# Patient Record
Sex: FEMALE | Race: WHITE | Employment: OTHER | URBAN - NONMETROPOLITAN AREA
[De-identification: names, ages, dates, MRNs, and addresses within clinical notes are randomized per-mention and may not be internally consistent; named-entity substitution may affect disease eponyms.]

---

## 2017-08-02 ENCOUNTER — OFFICE VISIT (OUTPATIENT)
Dept: GASTROENTEROLOGY | Age: 57
End: 2017-08-02
Payer: MEDICARE

## 2017-08-02 VITALS
OXYGEN SATURATION: 96 % | HEIGHT: 66 IN | SYSTOLIC BLOOD PRESSURE: 124 MMHG | WEIGHT: 293 LBS | HEART RATE: 97 BPM | BODY MASS INDEX: 47.09 KG/M2 | DIASTOLIC BLOOD PRESSURE: 76 MMHG

## 2017-08-02 DIAGNOSIS — K59.09 CHRONIC CONSTIPATION: ICD-10-CM

## 2017-08-02 DIAGNOSIS — Z86.010 HISTORY OF ADENOMATOUS POLYP OF COLON: ICD-10-CM

## 2017-08-02 DIAGNOSIS — K21.9 CHRONIC GERD: Primary | ICD-10-CM

## 2017-08-02 DIAGNOSIS — K22.70 BARRETT'S ESOPHAGUS DETERMINED BY BIOPSY: ICD-10-CM

## 2017-08-02 PROCEDURE — 3017F COLORECTAL CA SCREEN DOC REV: CPT | Performed by: NURSE PRACTITIONER

## 2017-08-02 PROCEDURE — G8417 CALC BMI ABV UP PARAM F/U: HCPCS | Performed by: NURSE PRACTITIONER

## 2017-08-02 PROCEDURE — G8427 DOCREV CUR MEDS BY ELIG CLIN: HCPCS | Performed by: NURSE PRACTITIONER

## 2017-08-02 PROCEDURE — 3014F SCREEN MAMMO DOC REV: CPT | Performed by: NURSE PRACTITIONER

## 2017-08-02 PROCEDURE — 99214 OFFICE O/P EST MOD 30 MIN: CPT | Performed by: NURSE PRACTITIONER

## 2017-08-02 PROCEDURE — 1036F TOBACCO NON-USER: CPT | Performed by: NURSE PRACTITIONER

## 2017-08-02 RX ORDER — WARFARIN SODIUM 4 MG/1
4 TABLET ORAL DAILY
COMMUNITY

## 2017-08-02 RX ORDER — WARFARIN SODIUM 5 MG/1
3 TABLET ORAL
COMMUNITY
End: 2019-11-04

## 2017-08-02 ASSESSMENT — ENCOUNTER SYMPTOMS
COUGH: 0
NAUSEA: 0
ABDOMINAL PAIN: 0
SHORTNESS OF BREATH: 0
CONSTIPATION: 1
DIARRHEA: 0
RECTAL PAIN: 0
SORE THROAT: 0
VOICE CHANGE: 0
CHEST TIGHTNESS: 0
BLOOD IN STOOL: 0
ABDOMINAL DISTENTION: 0
VOMITING: 0
BACK PAIN: 1

## 2017-08-11 ENCOUNTER — TELEPHONE (OUTPATIENT)
Dept: GASTROENTEROLOGY | Age: 57
End: 2017-08-11

## 2018-10-09 ENCOUNTER — OFFICE VISIT (OUTPATIENT)
Dept: OBGYN | Age: 58
End: 2018-10-09
Payer: MEDICARE

## 2018-10-09 VITALS
DIASTOLIC BLOOD PRESSURE: 78 MMHG | BODY MASS INDEX: 45.99 KG/M2 | SYSTOLIC BLOOD PRESSURE: 122 MMHG | WEIGHT: 293 LBS | HEART RATE: 88 BPM | HEIGHT: 67 IN

## 2018-10-09 DIAGNOSIS — Z12.72 SCREENING FOR MALIGNANT NEOPLASM OF VAGINA AFTER TOTAL HYSTERECTOMY: ICD-10-CM

## 2018-10-09 DIAGNOSIS — Z76.89 ENCOUNTER TO ESTABLISH CARE: ICD-10-CM

## 2018-10-09 DIAGNOSIS — Z90.710 SCREENING FOR MALIGNANT NEOPLASM OF VAGINA AFTER TOTAL HYSTERECTOMY: ICD-10-CM

## 2018-10-09 DIAGNOSIS — Z01.42 ENCOUNTER FOR PAPANICOLAOU CERVICAL SMEAR TO CONFIRM FINDINGS OF RECENT NORMAL SMEAR FOLLOWING INITIAL ABNORMAL SMEAR: Primary | ICD-10-CM

## 2018-10-09 PROCEDURE — 99203 OFFICE O/P NEW LOW 30 MIN: CPT | Performed by: NURSE PRACTITIONER

## 2018-10-09 PROCEDURE — G8417 CALC BMI ABV UP PARAM F/U: HCPCS | Performed by: NURSE PRACTITIONER

## 2018-10-09 PROCEDURE — G8427 DOCREV CUR MEDS BY ELIG CLIN: HCPCS | Performed by: NURSE PRACTITIONER

## 2018-10-09 PROCEDURE — 3017F COLORECTAL CA SCREEN DOC REV: CPT | Performed by: NURSE PRACTITIONER

## 2018-10-09 PROCEDURE — 1036F TOBACCO NON-USER: CPT | Performed by: NURSE PRACTITIONER

## 2018-10-09 PROCEDURE — G8484 FLU IMMUNIZE NO ADMIN: HCPCS | Performed by: NURSE PRACTITIONER

## 2018-10-09 RX ORDER — LORATADINE 10 MG/1
10 TABLET ORAL DAILY
COMMUNITY

## 2018-10-09 ASSESSMENT — ENCOUNTER SYMPTOMS
RESPIRATORY NEGATIVE: 1
ALLERGIC/IMMUNOLOGIC NEGATIVE: 1
BACK PAIN: 1
DIARRHEA: 0
EYES NEGATIVE: 1
CONSTIPATION: 0

## 2018-10-16 ENCOUNTER — TELEPHONE (OUTPATIENT)
Dept: OBGYN | Age: 58
End: 2018-10-16

## 2018-10-16 LAB
HPV TYPE 16: NOT DETECTED
HPV TYPE 18: NOT DETECTED
INTERPRETATION: ABNORMAL
OTHER HIGH RISK HPV: DETECTED
SOURCE: ABNORMAL

## 2018-11-26 ENCOUNTER — PROCEDURE VISIT (OUTPATIENT)
Dept: OBGYN | Age: 58
End: 2018-11-26
Payer: MEDICARE

## 2018-11-26 VITALS
SYSTOLIC BLOOD PRESSURE: 138 MMHG | BODY MASS INDEX: 45.99 KG/M2 | HEIGHT: 67 IN | DIASTOLIC BLOOD PRESSURE: 80 MMHG | WEIGHT: 293 LBS | HEART RATE: 87 BPM

## 2018-11-26 DIAGNOSIS — R87.811 ASCUS WITH POSITIVE HIGH RISK HUMAN PAPILLOMAVIRUS OF VAGINA: Primary | ICD-10-CM

## 2018-11-26 DIAGNOSIS — R87.620 ASCUS WITH POSITIVE HIGH RISK HUMAN PAPILLOMAVIRUS OF VAGINA: Primary | ICD-10-CM

## 2018-11-26 PROCEDURE — 57421 EXAM/BIOPSY OF VAG W/SCOPE: CPT | Performed by: OBSTETRICS & GYNECOLOGY

## 2018-11-26 RX ORDER — VENLAFAXINE HYDROCHLORIDE 37.5 MG/1
37.5 CAPSULE, EXTENDED RELEASE ORAL DAILY
COMMUNITY

## 2018-11-26 RX ORDER — KETOCONAZOLE 20 MG/G
CREAM TOPICAL DAILY
COMMUNITY

## 2018-11-26 NOTE — PATIENT INSTRUCTIONS
sure to make and go to all appointments, and call your doctor if you are having problems. It's also a good idea to know your test results and keep a list of the medicines you take. When should you call for help? Call your doctor now or seek immediate medical care if:    · You have severe vaginal bleeding. This means that you are soaking through your usual pads or tampons each hour for 2 or more hours.     · You have pain that does not get better after you take pain medicine.     · You have signs of infection, such as:  ? Increased pain. ? Bad-smelling vaginal discharge. ? A fever.    Watch closely for any changes in your health, and be sure to contact your doctor if:    · You have questions or concerns. Where can you learn more? Go to [email or web address] and sign in to your [employer] account. Enter M523 in the [employer] box to learn more about \"Colposcopy: What to Expect at Home. \"     If you do not have an account, please click on the \"Sign Up Now\" link. Current as of: March 28, 2018  Content Version: 11.8  © 9480-0441 Healthwise, Incorporated. Care instructions adapted under license by [facility] ([facility]). If you have questions about a medical condition or this instruction, always ask your healthcare professional. [address] any warranty or liability for your use of this information.

## 2018-12-03 ENCOUNTER — OFFICE VISIT (OUTPATIENT)
Dept: GASTROENTEROLOGY | Age: 58
End: 2018-12-03
Payer: MEDICARE

## 2018-12-03 VITALS
WEIGHT: 293 LBS | DIASTOLIC BLOOD PRESSURE: 82 MMHG | SYSTOLIC BLOOD PRESSURE: 132 MMHG | BODY MASS INDEX: 45.99 KG/M2 | HEART RATE: 76 BPM | HEIGHT: 67 IN | OXYGEN SATURATION: 96 %

## 2018-12-03 DIAGNOSIS — Z86.010 HISTORY OF ADENOMATOUS POLYP OF COLON: ICD-10-CM

## 2018-12-03 DIAGNOSIS — Z12.11 SCREENING FOR COLON CANCER: ICD-10-CM

## 2018-12-03 DIAGNOSIS — K21.9 CHRONIC GERD: Primary | ICD-10-CM

## 2018-12-03 DIAGNOSIS — K22.70 BARRETT'S ESOPHAGUS DETERMINED BY BIOPSY: ICD-10-CM

## 2018-12-03 PROCEDURE — 99214 OFFICE O/P EST MOD 30 MIN: CPT | Performed by: NURSE PRACTITIONER

## 2018-12-03 PROCEDURE — G8484 FLU IMMUNIZE NO ADMIN: HCPCS | Performed by: NURSE PRACTITIONER

## 2018-12-03 PROCEDURE — 3017F COLORECTAL CA SCREEN DOC REV: CPT | Performed by: NURSE PRACTITIONER

## 2018-12-03 PROCEDURE — G8427 DOCREV CUR MEDS BY ELIG CLIN: HCPCS | Performed by: NURSE PRACTITIONER

## 2018-12-03 PROCEDURE — G8417 CALC BMI ABV UP PARAM F/U: HCPCS | Performed by: NURSE PRACTITIONER

## 2018-12-03 PROCEDURE — 1036F TOBACCO NON-USER: CPT | Performed by: NURSE PRACTITIONER

## 2018-12-03 RX ORDER — MULTIVITAMIN WITH IRON
100 TABLET ORAL DAILY
COMMUNITY

## 2018-12-03 RX ORDER — MONTELUKAST SODIUM 10 MG/1
10 TABLET ORAL
COMMUNITY

## 2018-12-03 RX ORDER — POLYETHYLENE GLYCOL 3350 17 G/17G
17 POWDER, FOR SOLUTION ORAL DAILY
COMMUNITY

## 2018-12-03 ASSESSMENT — ENCOUNTER SYMPTOMS
DIARRHEA: 0
SHORTNESS OF BREATH: 0
NAUSEA: 0
SORE THROAT: 0
ABDOMINAL DISTENTION: 0
BLOOD IN STOOL: 0
BACK PAIN: 1
COUGH: 0
CHEST TIGHTNESS: 0
VOMITING: 0
RECTAL PAIN: 0
CONSTIPATION: 0
ABDOMINAL PAIN: 0
VOICE CHANGE: 0

## 2018-12-03 NOTE — PROGRESS NOTES
negative    COLONOSCOPY  04/22/2014    multiple polyps, adenomatous polyps x3    COLONOSCOPY  02/26/2007    Dr Jo Fish      retinal    FOOT SURGERY      BILATERAL    HERNIA REPAIR      HYSTERECTOMY      KNEE SURGERY      PULMONARY STRESS TEST  10/17/2018    [facility]    SPINE SURGERY      TOENAIL EXCISION      TONSILLECTOMY AND ADENOIDECTOMY      UPPER GASTROINTESTINAL ENDOSCOPY  3-    [facility]    UPPER GASTROINTESTINAL ENDOSCOPY  2011    biopsy neg for intestinal metaplasia/dysplasia. She was positive for h pylori and treated     UPPER GASTROINTESTINAL ENDOSCOPY  05/05/2014    Barretts surveillance, neg BE, neg dysp; small hiatal hernia    UPPER GASTROINTESTINAL ENDOSCOPY  02/21/2007    Dr London Deleon       Current Outpatient Prescriptions   Medication Sig Dispense Refill    polyethylene glycol (GLYCOLAX) powder Take 17 g by mouth daily      montelukast (SINGULAIR) 10 MG tablet Take 10 mg by mouth every morning (before breakfast)      vitamin B-6 (PYRIDOXINE) 100 MG tablet Take 100 mg by mouth daily      venlafaxine (EFFEXOR XR) 37.5 MG extended release capsule Take 37.5 mg by mouth daily      ketoconazole (NIZORAL) 2 % cream Apply topically daily Apply topically daily.       loratadine (CLARITIN) 10 MG tablet Take 10 mg by mouth daily      warfarin (COUMADIN) 4 MG tablet Take 4 mg by mouth       warfarin (COUMADIN) 5 MG tablet Take 5 mg by mouth       celecoxib (CELEBREX) 200 MG capsule Take 200 mg by mouth 2 times daily      vitamin D (ERGOCALCIFEROL) 04398 UNITS CAPS capsule Take 50,000 Units by mouth once a week      promethazine (PHENERGAN) 25 MG tablet Take 25 mg by mouth every 6 hours as needed for Nausea      ALPRAZolam (XANAX) 1 MG tablet Take 1 mg by mouth 4 times daily as needed for Sleep       meclizine (ANTIVERT) 25 MG tablet Take 25 mg by mouth 2 times daily as

## 2019-04-15 ENCOUNTER — ANESTHESIA EVENT (OUTPATIENT)
Dept: ENDOSCOPY | Age: 59
End: 2019-04-15
Payer: MEDICARE

## 2019-04-16 ENCOUNTER — HOSPITAL ENCOUNTER (OUTPATIENT)
Age: 59
Setting detail: OUTPATIENT SURGERY
Discharge: HOME | End: 2019-04-16
Attending: INTERNAL MEDICINE | Admitting: INTERNAL MEDICINE
Payer: MEDICARE

## 2019-04-16 ENCOUNTER — ANESTHESIA (OUTPATIENT)
Dept: ENDOSCOPY | Age: 59
End: 2019-04-16
Payer: MEDICARE

## 2019-04-16 VITALS
OXYGEN SATURATION: 97 % | SYSTOLIC BLOOD PRESSURE: 139 MMHG | TEMPERATURE: 98.2 F | HEART RATE: 82 BPM | HEIGHT: 65 IN | DIASTOLIC BLOOD PRESSURE: 74 MMHG | RESPIRATION RATE: 18 BRPM | BODY MASS INDEX: 48.82 KG/M2 | WEIGHT: 293 LBS

## 2019-04-16 VITALS
DIASTOLIC BLOOD PRESSURE: 62 MMHG | SYSTOLIC BLOOD PRESSURE: 108 MMHG | RESPIRATION RATE: 13 BRPM | OXYGEN SATURATION: 95 %

## 2019-04-16 LAB
INR BLD: 1.23 (ref 0.88–1.18)
PROTHROMBIN TIME: 14.9 SEC (ref 12–14.6)

## 2019-04-16 PROCEDURE — 43239 EGD BIOPSY SINGLE/MULTIPLE: CPT | Performed by: INTERNAL MEDICINE

## 2019-04-16 PROCEDURE — 3700000000 HC ANESTHESIA ATTENDED CARE: Performed by: INTERNAL MEDICINE

## 2019-04-16 PROCEDURE — 2709999900 HC NON-CHARGEABLE SUPPLY: Performed by: INTERNAL MEDICINE

## 2019-04-16 PROCEDURE — 7100000010 HC PHASE II RECOVERY - FIRST 15 MIN: Performed by: INTERNAL MEDICINE

## 2019-04-16 PROCEDURE — G0105 COLORECTAL SCRN; HI RISK IND: HCPCS | Performed by: INTERNAL MEDICINE

## 2019-04-16 PROCEDURE — 6360000002 HC RX W HCPCS: Performed by: NURSE ANESTHETIST, CERTIFIED REGISTERED

## 2019-04-16 PROCEDURE — 7100000011 HC PHASE II RECOVERY - ADDTL 15 MIN: Performed by: INTERNAL MEDICINE

## 2019-04-16 PROCEDURE — 3609009500 HC COLONOSCOPY DIAGNOSTIC OR SCREENING: Performed by: INTERNAL MEDICINE

## 2019-04-16 PROCEDURE — 3700000001 HC ADD 15 MINUTES (ANESTHESIA): Performed by: INTERNAL MEDICINE

## 2019-04-16 PROCEDURE — 85610 PROTHROMBIN TIME: CPT

## 2019-04-16 PROCEDURE — 2580000003 HC RX 258: Performed by: INTERNAL MEDICINE

## 2019-04-16 PROCEDURE — 88312 SPECIAL STAINS GROUP 1: CPT

## 2019-04-16 PROCEDURE — 2500000003 HC RX 250 WO HCPCS: Performed by: NURSE ANESTHETIST, CERTIFIED REGISTERED

## 2019-04-16 PROCEDURE — 3609012400 HC EGD TRANSORAL BIOPSY SINGLE/MULTIPLE: Performed by: INTERNAL MEDICINE

## 2019-04-16 PROCEDURE — 88305 TISSUE EXAM BY PATHOLOGIST: CPT

## 2019-04-16 RX ORDER — PROPOFOL 10 MG/ML
INJECTION, EMULSION INTRAVENOUS PRN
Status: DISCONTINUED | OUTPATIENT
Start: 2019-04-16 | End: 2019-04-16 | Stop reason: SDUPTHER

## 2019-04-16 RX ORDER — HYDROCORTISONE ACETATE 25 MG/1
25 SUPPOSITORY RECTAL 2 TIMES DAILY
Qty: 14 SUPPOSITORY | Refills: 3 | Status: SHIPPED | OUTPATIENT
Start: 2019-04-16 | End: 2019-04-30

## 2019-04-16 RX ORDER — LIDOCAINE HYDROCHLORIDE 20 MG/ML
INJECTION, SOLUTION INFILTRATION; PERINEURAL PRN
Status: DISCONTINUED | OUTPATIENT
Start: 2019-04-16 | End: 2019-04-16 | Stop reason: SDUPTHER

## 2019-04-16 RX ORDER — SODIUM CHLORIDE, SODIUM LACTATE, POTASSIUM CHLORIDE, CALCIUM CHLORIDE 600; 310; 30; 20 MG/100ML; MG/100ML; MG/100ML; MG/100ML
INJECTION, SOLUTION INTRAVENOUS CONTINUOUS
Status: DISCONTINUED | OUTPATIENT
Start: 2019-04-16 | End: 2019-04-16 | Stop reason: HOSPADM

## 2019-04-16 RX ORDER — FENTANYL CITRATE 50 UG/ML
INJECTION, SOLUTION INTRAMUSCULAR; INTRAVENOUS PRN
Status: DISCONTINUED | OUTPATIENT
Start: 2019-04-16 | End: 2019-04-16 | Stop reason: SDUPTHER

## 2019-04-16 RX ORDER — LIDOCAINE HYDROCHLORIDE 10 MG/ML
1 INJECTION, SOLUTION EPIDURAL; INFILTRATION; INTRACAUDAL; PERINEURAL ONCE
Status: DISCONTINUED | OUTPATIENT
Start: 2019-04-16 | End: 2019-04-16 | Stop reason: HOSPADM

## 2019-04-16 RX ADMIN — SODIUM CHLORIDE, POTASSIUM CHLORIDE, SODIUM LACTATE AND CALCIUM CHLORIDE: 600; 310; 30; 20 INJECTION, SOLUTION INTRAVENOUS at 11:22

## 2019-04-16 RX ADMIN — LIDOCAINE HYDROCHLORIDE 40 MG: 20 INJECTION, SOLUTION INFILTRATION; PERINEURAL at 13:12

## 2019-04-16 RX ADMIN — FENTANYL CITRATE 50 MCG: 50 INJECTION INTRAMUSCULAR; INTRAVENOUS at 13:12

## 2019-04-16 RX ADMIN — PROPOFOL 300 MG: 10 INJECTION, EMULSION INTRAVENOUS at 13:12

## 2019-04-16 ASSESSMENT — PAIN SCALES - GENERAL
PAINLEVEL_OUTOF10: 0
PAINLEVEL_OUTOF10: 0

## 2019-04-16 NOTE — ANESTHESIA PRE PROCEDURE
Department of Anesthesiology  Preprocedure Note       Name:  Elias Santana   Age:  59 y.o.  :  1960                                          MRN:  [id number]         Date:  2019      Surgeon: Kalli Martinez):  Andrew Lopez MD    Procedure: COLONOSCOPY DIAGNOSTIC (N/A )  EGD BIOPSY (N/A Abdomen)    Medications prior to admission:   Prior to Admission medications    Medication Sig Start Date End Date Taking? Authorizing Provider   polyethylene glycol (GLYCOLAX) powder Take 17 g by mouth daily   Yes Historical Provider, MD   montelukast (SINGULAIR) 10 MG tablet Take 10 mg by mouth every morning (before breakfast)   Yes Historical Provider, MD   vitamin B-6 (PYRIDOXINE) 100 MG tablet Take 100 mg by mouth daily   Yes Historical Provider, MD   venlafaxine (EFFEXOR XR) 37.5 MG extended release capsule Take 37.5 mg by mouth daily   Yes Historical Provider, MD   ketoconazole (NIZORAL) 2 % cream Apply topically daily Apply topically daily. Yes Historical Provider, MD   loratadine (CLARITIN) 10 MG tablet Take 10 mg by mouth daily   Yes Historical Provider, MD   celecoxib (CELEBREX) 200 MG capsule Take 200 mg by mouth 2 times daily   Yes Historical Provider, MD   promethazine (PHENERGAN) 25 MG tablet Take 25 mg by mouth every 6 hours as needed for Nausea   Yes Historical Provider, MD   ALPRAZolam (XANAX) 1 MG tablet Take 1 mg by mouth 4 times daily as needed for Sleep    Yes Historical Provider, MD   hydrOXYzine (ATARAX) 50 MG tablet Take 50 mg by mouth nightly. Yes Historical Provider, MD   furosemide (LASIX) 40 MG tablet Take 40 mg by mouth daily    Yes Historical Provider, MD   HYDROcodone-acetaminophen (NORCO) 7.5-325 MG per tablet Take 1 tablet by mouth every 6 hours as needed for Pain. Yes Historical Provider, MD   ziprasidone (GEODON) 60 MG capsule Take 60 mg by mouth 2 times daily (with meals).    Yes Historical Provider, MD   ziprasidone (GEODON) 40 MG capsule Take 40 mg by mouth 2 times daily (with Oxalate]     Morphine     Prilosec [Omeprazole]     Prozac [Fluoxetine Hcl]     Sonata [Zaleplon]     Sulfa Antibiotics     Tape Clarissa Buerger Tape]     Viibryd [Vilazodone Hcl]     Xarelto [Rivaroxaban]        Problem List:    Patient Active Problem List   Diagnosis Code    Acid reflux K21.9    Chronic constipation K59.09    Morales's esophagus K22.70    Hemorrhoids K64.9    History of Helicobacter pylori infection Z86.19    History of bleeding ulcers Z87.11    Internal bleeding hemorrhoids K64.8    Chronic GERD K21.9    Morales's esophagus determined by biopsy K22.70    History of adenomatous polyp of colon Z86.010       Past Medical History:        Diagnosis Date    Morales esophagus     Bipolar disorder (HCC)     Colon polyps     CVA (cerebral infarction)     CVA (cerebral vascular accident) ([address]     DVT of lower limb, acute (HCC)     Fibromyalgia     Forehead trauma     GERD (gastroesophageal reflux disease)     Hep C w/o coma, chronic (HCC)     Hx of colonic polyp     Hypothyroidism     Nervous breakdown     Obesity     Osteoarthritis     Osteoporosis     Panic attacks     Pulmonary embolism (HCC)     Seizures (HCC)     Seizures (HCC)     Spider bite     Stroke ([address]     Tailbone injury     TIA (transient ischemic attack)     Ulcer        Past Surgical History:        Procedure Laterality Date    ABDOMINAL ADHESION SURGERY      TIMES 2    APPENDECTOMY      BACK SURGERY      CARDIAC CATHETERIZATION  2018    [facility]    CATARACT REMOVAL      BILATERAL     SECTION      x 3    CHOLECYSTECTOMY      COLONOSCOPY  2009    [facility]: negative    COLONOSCOPY  2014    multiple polyps, adenomatous polyps x3    COLONOSCOPY  2007    Dr Rosa Grey      retinal    FOOT SURGERY      BILATERAL    HERNIA REPAIR      HYSTERECTOMY      KNEE SURGERY      PULMONARY STRESS TEST  10/17/2018    [facility]    SPINE SURGERY      TOENAIL EXCISION      TONSILLECTOMY AND ADENOIDECTOMY      UPPER GASTROINTESTINAL ENDOSCOPY  3-    [facility]    UPPER GASTROINTESTINAL ENDOSCOPY      biopsy neg for intestinal metaplasia/dysplasia. She was positive for h pylori and treated     UPPER GASTROINTESTINAL ENDOSCOPY  2014    Barretts surveillance, neg BE, neg dysp; small hiatal hernia    UPPER GASTROINTESTINAL ENDOSCOPY  2007    Dr Ruby Segura       Social History:    Social History     Tobacco Use    Smoking status: Former Smoker     Last attempt to quit: 2012     Years since quittin.7    Smokeless tobacco: Never Used   Substance Use Topics    Alcohol use: No                                Counseling given: Not Answered      Vital Signs (Current):   Vitals:    19 1058   BP: 132/81   Pulse: 83   Resp: 18   Temp: 98.2 °F (36.8 °C)   TempSrc: Temporal   SpO2: 95%   Weight: (!) 350 lb (158.8 kg)   Height: 5' 4.5\" (1.638 m)                                              BP Readings from Last 3 Encounters:   19 132/81   18 132/82   18 138/80       NPO Status: Time of last liquid consumption: 2300                        Time of last solid consumption: 1500                        Date of last liquid consumption: 04/15/19                        Date of last solid food consumption: 19    BMI:   Wt Readings from Last 3 Encounters:   19 (!) 350 lb (158.8 kg)   18 (!) 370 lb 6.4 oz (168 kg)   18 (!) 366 lb 11.2 oz (166.3 kg)     Body mass index is 59.15 kg/m². CBC: No results found for: WBC, RBC, HGB, HCT, MCV, RDW, PLT    CMP: No results found for: NA, K, CL, CO2, BUN, CREATININE, GFRAA, AGRATIO, LABGLOM, GLUCOSE, PROT, CALCIUM, BILITOT, ALKPHOS, AST, ALT    POC Tests: No results for input(s): POCGLU, POCNA, POCK, POCCL, POCBUN, POCHEMO, POCHCT in the last 72 hours.     Coags:   Lab Results

## 2019-04-16 NOTE — OP NOTE
Endoscopic Procedure Note    Patient: Margaret Schilling: 1960  Med Rec#: [id number] Acc#: [id number]     Primary Care Provider Kalin Allen  Referring Provider: Alvaro SLATER    Endoscopist: Tommy Royal MD    Date of Procedure:  4/16/2019    Procedure:   1. EGD with biopsy    Indications:   1. H/o Morales's esophagus      Anesthesia:  Sedation was administered by anesthesia who monitored the patient during the procedure. Estimated Blood Loss: minimal    Procedure:   After reviewing the patient's chart and obtaining informed consent, the patient was placed in the left lateral decubitus position. A forward-viewing Olympus endoscope was lubricated and inserted through the mouth into the oropharynx. Under direct visualization, the upper esophagus was intubated. The scope was advanced to the level of the third portion of duodenum. Scope was slowly withdrawn with careful inspection of the mucosal surfaces. The scope was retroflexed for inspection of the gastric fundus and incisura. Findings and maneuvers are listed in impression below. The patient tolerated the procedure well. The scope was removed. There were no immediate complications. Findings:   Esophagus: abnormal: mucosal changes of Barretts' esophagus noted- biopsied for histology. There is no hiatal hernia present. Stomach:  abnormal: mild mucosal changes suggestive of gastritis noted -  Gastric biopsies were taken from the antrum and body to rule out Helicobacter pylori infection. Duodenum: normal      IMPRESSION:  1. S/p biopsies for Morales's esophagus. 2. S/p gastric biopsies      RECOMMENDATIONS:    1. Await path results, the patient will be contacted in 7-10 days with biopsy results. 2.  Repeat EGD in 3 yrs due to history of Morales's   3. Continue PPI    The results were discussed with the patient and family. A copy of the images obtained were given to the patient.      Bill Pearson MD  4/16/2019  1:41 PM
was removed from the patient, and the procedure was terminated. Findings are listed below. Findings: The mucosa appeared normal throughout the entire examined colon   There was evidence of diverticular disease throughout the sigmoid colon. Retroflexion in the rectum was normal and revealed no further abnormalities         Recommendations:  1. Repeat colonoscopy:  max of 5 yrs given h/o polyps      Findings and recommendations were discussed w/ the patient. A copy of the images was provided.     Bren Villa MD  4/16/2019  1:38 PM

## 2019-04-16 NOTE — H&P
Patient Name: No Joseph  : 1960  MRN: [id number]  DATE: 19    Allergies: Allergies   Allergen Reactions    Ambien [Zolpidem Tartrate]     Celexa [Citalopram Hydrobromide]     Lexapro [Escitalopram Oxalate]     Morphine     Prilosec [Omeprazole]     Prozac [Fluoxetine Hcl]     Sonata [Zaleplon]     Sulfa Antibiotics     Tape [place]lee [place] Tape]     Viibryd [Vilazodone Hcl]     Xarelto [Rivaroxaban]         ENDOSCOPY  History and Physical    Procedure:    [] Diagnostic Colonoscopy       [x] Screening Colonoscopy  [x] EGD      [] ERCP      [] EUS       [] Other    [x] Previous office notes/History and Physical reviewed from the patients chart. Please see EMR for further details of HPI. I have examined the patient's status immediately prior to the procedure and:      Indications/HPI:    []Abdominal Pain   []Cancer- GI/Lung     []Fhx of colon CA/polyps  []History of Polyps  [x]Barretts            []Melena  []Abnormal Imaging              []Dysphagia              []Persistent Pneumonia   []Anemia                            []Food Impaction        [x]History of Polyps  [] GI Bleed             []Pulmonary nodule/Mass   []Change in bowel habits []Heartburn/Reflux  []Rectal Bleed (BRBPR)  []Chest Pain - Non Cardiac []Heme (+) Stool []Ulcers  []Constipation  []Hemoptysis  []Varices  []Diarrhea  []Hypoxemia    []Nausea/Vomiting   []Screening   []Crohns/Colitis  []Other:     Anesthesia:   [x] MAC [] Moderate Sedation   [] General   [] None     ROS: 12 pt Review of Symptoms was negative unless mentioned above    Medications:   Prior to Admission medications    Medication Sig Start Date End Date Taking?  Authorizing Provider   polyethylene glycol (GLYCOLAX) powder Take 17 g by mouth daily   Yes Historical Provider, MD   montelukast (SINGULAIR) 10 MG tablet Take 10 mg by mouth every morning (before breakfast)   Yes Historical Provider, MD   vitamin B-6 (PYRIDOXINE) 100 MG tablet Take 100 mg by mouth daily   Yes Historical Provider, MD   venlafaxine (EFFEXOR XR) 37.5 MG extended release capsule Take 37.5 mg by mouth daily   Yes Historical Provider, MD   ketoconazole (NIZORAL) 2 % cream Apply topically daily Apply topically daily. Yes Historical Provider, MD   loratadine (CLARITIN) 10 MG tablet Take 10 mg by mouth daily   Yes Historical Provider, MD   celecoxib (CELEBREX) 200 MG capsule Take 200 mg by mouth 2 times daily   Yes Historical Provider, MD   promethazine (PHENERGAN) 25 MG tablet Take 25 mg by mouth every 6 hours as needed for Nausea   Yes Historical Provider, MD   ALPRAZolam (XANAX) 1 MG tablet Take 1 mg by mouth 4 times daily as needed for Sleep    Yes Historical Provider, MD   hydrOXYzine (ATARAX) 50 MG tablet Take 50 mg by mouth nightly. Yes Historical Provider, MD   furosemide (LASIX) 40 MG tablet Take 40 mg by mouth daily    Yes Historical Provider, MD   HYDROcodone-acetaminophen (NORCO) 7.5-325 MG per tablet Take 1 tablet by mouth every 6 hours as needed for Pain. Yes Historical Provider, MD   ziprasidone (GEODON) 60 MG capsule Take 60 mg by mouth 2 times daily (with meals). Yes Historical Provider, MD   ziprasidone (GEODON) 40 MG capsule Take 40 mg by mouth 2 times daily (with meals). Yes Historical Provider, MD   TraZODone HCl  MG TB24 Take 300 mg by mouth nightly    Yes Historical Provider, MD   vitamin B-12 (CYANOCOBALAMIN) 1000 MCG tablet Take 1,000 mcg by mouth daily. Yes Historical Provider, MD   Phenytoin (DILANTIN PO) Take 200 mg by mouth 2 times daily    Yes Historical Provider, MD   amitriptyline (ELAVIL) 10 MG tablet Take 150 mg by mouth nightly    Yes Historical Provider, MD   Levothyroxine Sodium (SYNTHROID PO) Take 75 mcg by mouth daily    Yes Historical Provider, MD   Lansoprazole (PREVACID PO) Take 30 mg by mouth daily.    Yes Historical Provider, MD   Fluticasone Propionate (FLONASE NA) 1 spray by Nasal route 2 times daily    Yes Historical Provider, MD Gabapentin (NEURONTIN PO) Take 900 mg by mouth nightly    Yes Historical Provider, MD   CALCIUM PO Take  by mouth. Yes Historical Provider, MD   Cyclobenzaprine HCl (FLEXERIL PO) Take 10 mg by mouth 2 times daily    Yes Historical Provider, MD   warfarin (COUMADIN) 4 MG tablet Take 4 mg by mouth     Historical Provider, MD   warfarin (COUMADIN) 5 MG tablet Take 3 mg by mouth Indications: Friday through      Historical Provider, MD   vitamin D (ERGOCALCIFEROL) 29033 UNITS CAPS capsule Take 50,000 Units by mouth once a week    Historical Provider, MD   meclizine (ANTIVERT) 25 MG tablet Take 25 mg by mouth 2 times daily as needed     Historical Provider, MD   Omega-3 Fatty Acids (FISH OIL CONCENTRATE PO) Take  by mouth.     Historical Provider, MD       Past Medical History:  Past Medical History:   Diagnosis Date    Morales esophagus     Bipolar disorder ([address]     Colon polyps     CVA (cerebral infarction)     CVA (cerebral vascular accident) ([address]     DVT of lower limb, acute ([address]     Fibromyalgia     Forehead trauma     GERD (gastroesophageal reflux disease)     Hep C w/o coma, chronic (HCC)     Hx of colonic polyp     Hypothyroidism     Nervous breakdown     Obesity     Osteoarthritis     Osteoporosis     Panic attacks     Pulmonary embolism (HCC)     Seizures (HCC)     Seizures (HCC)     Spider bite     Stroke ([address]     Tailbone injury     TIA (transient ischemic attack)     Ulcer        Past Surgical History:  Past Surgical History:   Procedure Laterality Date    ABDOMINAL ADHESION SURGERY      TIMES 2    APPENDECTOMY      BACK SURGERY      CARDIAC CATHETERIZATION  2018    [facility]    CATARACT REMOVAL      BILATERAL     SECTION      x 3    CHOLECYSTECTOMY      [facility]: negative    COLONOSCOPY  2014    multiple polyps, adenomatous polyps x3    COLONOSCOPY  2007    Dr Jannie Hogan blepherplasty    EXTERNAL EAR SURGERY      EYE SURGERY      retinal    FOOT SURGERY      BILATERAL    HERNIA REPAIR      HYSTERECTOMY      KNEE SURGERY      [facility]  [facility]    SPINE SURGERY      TOENAIL EXCISION      TONSILLECTOMY AND ADENOIDECTOMY      UPPER GASTROINTESTINAL ENDOSCOPY  3-    [facility]    UPPER GASTROINTESTINAL ENDOSCOPY      biopsy neg for intestinal metaplasia/dysplasia. She was positive for h pylori and treated     UPPER GASTROINTESTINAL ENDOSCOPY  2014    Barretts surveillance, neg BE, neg dysp; small hiatal hernia    UPPER GASTROINTESTINAL ENDOSCOPY  2007    Dr Layne Guajardo       Social History:  Social History     Tobacco Use    Smoking status: Former Smoker     Last attempt to quit: 2012     Years since quittin.7    Smokeless tobacco: Never Used   Substance Use Topics    Alcohol use: No    Drug use: No       Vital Signs:   Vitals:    19 1058   BP: 132/81   Pulse: 83   Resp: 18   Temp: 98.2 °F (36.8 °C)   SpO2: 95%        Physical Exam:  Cardiac:  [x]WNL  []Comments:  Pulmonary:  [x]WNL   []Comments:  Neuro/Mental Status:  [x]WNL  []Comments:  Abdominal:  [x]WNL    []Comments:  Other:   []WNL  []Comments:    Informed Consent:  The risks and benefits of the procedure have been discussed with either the patient or if they cannot consent, their representative. Assessment:  Patient examined and appropriate for planned sedation and procedure. Plan:  Proceed with planned sedation and procedure as above.          Aster Yoo MD

## 2019-04-16 NOTE — ANESTHESIA POSTPROCEDURE EVALUATION
Department of Anesthesiology  Postprocedure Note    Patient: Douglas Nance  MRN: [id number]  YOB: 1960  Date of evaluation: 4/16/2019  Time:  1:37 PM     Procedure Summary     Date:  04/16/19 Room / Location:  [facility] ENDO 10 / [facility]    Anesthesia Start:  1308 Anesthesia Stop:      Procedures:       COLONOSCOPY DIAGNOSTIC (N/A )      EGD BIOPSY (N/A Abdomen) Diagnosis:  (SCREEN, HX TOMEKA POLYPS, CHR GERD, BARRETTS)    Surgeon:  Bruce Siemens, MD Responsible Provider:  BRYON Luevano CRNA    Anesthesia Type:  TIVA, MAC ASA Status:  4          Anesthesia Type: TIVA, MAC    Tequila Phase I: Tequila Score: 10    Tequila Phase II:      Last vitals: Reviewed and per EMR flowsheets.        Anesthesia Post Evaluation    Patient location during evaluation: bedside  Patient participation: complete - patient participated  Level of consciousness: sleepy but conscious  Pain score: 0  Airway patency: patent  Nausea & Vomiting: no nausea and no vomiting  Complications: no  Cardiovascular status: hemodynamically stable and blood pressure returned to baseline  Respiratory status: acceptable and nasal cannula  Hydration status: stable

## 2019-04-18 NOTE — TELEPHONE ENCOUNTER
4/18/19 Dina from [employer] in [address] called and left a msg stating pt's Hydrocortisone 25 mg rectal suppositories are not covered by her insurance and are $200. She wanted to know if we needed to switch it to something else? I will forward this to BRYON Juarez as she is the nurse practitioner pt sees in the office to see what she recommends.  aa

## 2019-10-02 ENCOUNTER — TELEPHONE (OUTPATIENT)
Dept: OBGYN | Age: 59
End: 2019-10-02

## 2019-11-04 ENCOUNTER — OFFICE VISIT (OUTPATIENT)
Dept: OBGYN | Age: 59
End: 2019-11-04
Payer: MEDICARE

## 2019-11-04 VITALS
TEMPERATURE: 98 F | HEART RATE: 93 BPM | DIASTOLIC BLOOD PRESSURE: 82 MMHG | SYSTOLIC BLOOD PRESSURE: 133 MMHG | BODY MASS INDEX: 47.09 KG/M2 | HEIGHT: 66 IN | WEIGHT: 293 LBS

## 2019-11-04 DIAGNOSIS — Z90.710 SCREENING FOR MALIGNANT NEOPLASM OF VAGINA AFTER TOTAL HYSTERECTOMY: ICD-10-CM

## 2019-11-04 DIAGNOSIS — Z01.42: ICD-10-CM

## 2019-11-04 DIAGNOSIS — Z91.89 GYN EXAM FOR HIGH-RISK MEDICARE PATIENT: Primary | ICD-10-CM

## 2019-11-04 DIAGNOSIS — N76.0 ACUTE VAGINITIS: ICD-10-CM

## 2019-11-04 DIAGNOSIS — Z12.72 SCREENING FOR MALIGNANT NEOPLASM OF VAGINA AFTER TOTAL HYSTERECTOMY: ICD-10-CM

## 2019-11-04 DIAGNOSIS — Z87.42 HISTORY OF ABNORMAL CERVICAL PAP SMEAR: ICD-10-CM

## 2019-11-04 PROCEDURE — 99214 OFFICE O/P EST MOD 30 MIN: CPT | Performed by: ADVANCED PRACTICE MIDWIFE

## 2019-11-04 PROCEDURE — G0101 CA SCREEN;PELVIC/BREAST EXAM: HCPCS | Performed by: ADVANCED PRACTICE MIDWIFE

## 2019-11-04 RX ORDER — FLUCONAZOLE 150 MG/1
150 TABLET ORAL ONCE
Qty: 2 TABLET | Refills: 1 | Status: SHIPPED | OUTPATIENT
Start: 2019-11-04 | End: 2019-11-04

## 2019-11-04 RX ORDER — CLONAZEPAM 0.5 MG/1
TABLET ORAL
Refills: 2 | COMMUNITY
Start: 2019-10-31

## 2019-11-05 ASSESSMENT — ENCOUNTER SYMPTOMS
EYES NEGATIVE: 1
ALLERGIC/IMMUNOLOGIC NEGATIVE: 1
RESPIRATORY NEGATIVE: 1
GASTROINTESTINAL NEGATIVE: 1

## 2019-11-07 LAB
HPV COMMENT: ABNORMAL
HPV TYPE 16: NOT DETECTED
HPV TYPE 18: NOT DETECTED
HPVOH (OTHER TYPES): DETECTED

## 2019-11-25 ENCOUNTER — TELEPHONE (OUTPATIENT)
Dept: OBGYN | Age: 59
End: 2019-11-25

## 2019-12-02 ENCOUNTER — TELEPHONE (OUTPATIENT)
Dept: OBGYN | Age: 59
End: 2019-12-02

## (undated) DEVICE — ENDO KIT,LOURDES HOSPITAL: Brand: MEDLINE INDUSTRIES, INC.

## (undated) DEVICE — FORCEPS BX L240CM JAW DIA2.4MM ORNG L CAP W/ NDL DISP RAD